# Patient Record
(demographics unavailable — no encounter records)

---

## 2025-05-18 NOTE — REASON FOR VISIT
[Telephone (audio)] : This telephonic visit was provided via audio only technology. [Technical] : patient unable to effectively utilize tele-video due to technical issues. [Follow-Up: _____] : a [unfilled] follow-up visit

## 2025-05-19 NOTE — ASSESSMENT
[FreeTextEntry1] : Ms. SHENA REYNA, 41 year old female, currently vaping, former smoker (1 ppd x 20 yrs, quit 2020), w/ hx of hypothyroidism, GERD, COID-19 (2022), anxiety, and depression, who found to have lung nodule incidentally on CT A/P for abdominal pain. Patient was consulted on 07/15/2024.   I have reviewed the patient's medical records and diagnostic images at time of this office consultation and have made the following recommendation: 1. CT chest reviewed and explained to patient, stable lung nodule, I recommended patient to return to office in 6 months with CT Chest without contrast.   I, ZAHIDA Patel, personally performed the evaluation and management (E/M) services for this established patient who follow up today with an existing condition.  That E/M includes conducting the examination, assessing all new/exacerbated/existing conditions, and establishing a plan of care.  Today, my ACP, ERVIN VillanuevaC, was here to observe my evaluation and management services for this existing condition to be followed going forward.

## 2025-05-19 NOTE — CONSULT LETTER
[Dear  ___] : Dear  [unfilled], [Consult Letter:] : I had the pleasure of evaluating your patient, [unfilled]. [Please see my note below.] : Please see my note below. [Consult Closing:] : Thank you very much for allowing me to participate in the care of this patient.  If you have any questions, please do not hesitate to contact me. [Sincerely,] : Sincerely, [FreeTextEntry2] : Dr. Casey Young (PCP/Ref) [FreeTextEntry3] : Akhil Angeles MD  Attending Surgeon  Division of Thoracic Surgery  , Cardiovascular and Thoracic Surgery  [facility] at [facility]

## 2025-05-19 NOTE — HISTORY OF PRESENT ILLNESS
[FreeTextEntry1] : Ms. SHENA REYNA, 41 year old female, currently vaping, former smoker (1 ppd x 20 yrs, quit 2020), w/ hx of hypothyroidism, GERD, COID-19 (2022), anxiety, and depression, who found to have lung nodule incidentally on CT A/P for abdominal pain. Patient was consulted on 07/15/2024.   Of note, she underwent a Robotic assisted Laparoscopic cholecystectomy on 06/24/2024 pathology results are consistent with Cholecystitis.  CT Coronary Angio on 05/03/23 ([facility]): - Indeterminate 8 mm right middle lobe pulmonary nodule.  CT A/P on 05/08/2024: - 10 x 9 mm solid subpleural nodule in the medial RML, new since 2016.  CT Chest on 06/15/24 ([facility]): - There is a solid noncalcified 8 mm right middle lobe medial subpleural pulmonary nodule on image 181, series 8, stable from 5/8/2023. - No additional pulmonary nodule identified.  On 07/15/2024, Imaging reviewed with patient, RML nodule first noted on CT in 05/2023, appears stable on most recent scan, of unclear etiology. I discussed obtaining PET/CT for further evaluation. I also want her to obtain PFTs.   PET/CT on 08/21/2024:  - Revisualized RIGHT LOWER lobe lung nodule again abuts the pericardium (1.0 x 0.8 cm, image 113), demonstrates unremarkable uptake indistinguishable from adjacent pericardium as well as some adjacent liver. Evaluation is somewhat suboptimal due to its location at the lung base and close proximity to the pericardial border and diaphragm.  PFTs on 07/26/2024: Cannot tolerated due to post-op pain from cholecystectomy.   On 08/26/2024, PET/CT reviewed with patient; no activity noted to RLL nodule, of unclear etiology. Discussed options including surgical resection for tissue diagnosis vs. surveillance. Surgical approach reviewed with patient. Discussed risks in details. Patient would like to think over her options and follow up via tele visit in 4 weeks to discuss decision.   On 09/30/2024, Discussed with patient, RLL nodule not PET avid. We discussed surgical resection vs continue f/u with CT chest. Patient prefers conservative management. Will repeat CT chest w/o contrast in 11/2024 and RTC after to discuss findings. If RLL nodule enlarges, will resect.   CT chest on 05/05/2025:  -  Stable 9 x 9 mm right middle lobe pulmonary nodule.  Patient is followed today via Telephonic visit. Patient c/o SOB on exertion, denies cough, chest pain, fever, chills.  Vaginal delivery

## 2025-05-19 NOTE — HISTORY OF PRESENT ILLNESS
[FreeTextEntry1] : Ms. SHENA REYNA, 41 year old female, currently vaping, former smoker (1 ppd x 20 yrs, quit 2020), w/ hx of hypothyroidism, GERD, COID-19 (2022), anxiety, and depression, who found to have lung nodule incidentally on CT A/P for abdominal pain. Patient was consulted on 07/15/2024.   Of note, she underwent a Robotic assisted Laparoscopic cholecystectomy on 06/24/2024 pathology results are consistent with Cholecystitis.  CT Coronary Angio on 05/03/23 ([facility]): - Indeterminate 8 mm right middle lobe pulmonary nodule.  CT A/P on 05/08/2024: - 10 x 9 mm solid subpleural nodule in the medial RML, new since 2016.  CT Chest on 06/15/24 ([facility]): - There is a solid noncalcified 8 mm right middle lobe medial subpleural pulmonary nodule on image 181, series 8, stable from 5/8/2023. - No additional pulmonary nodule identified.  On 07/15/2024, Imaging reviewed with patient, RML nodule first noted on CT in 05/2023, appears stable on most recent scan, of unclear etiology. I discussed obtaining PET/CT for further evaluation. I also want her to obtain PFTs.   PET/CT on 08/21/2024:  - Revisualized RIGHT LOWER lobe lung nodule again abuts the pericardium (1.0 x 0.8 cm, image 113), demonstrates unremarkable uptake indistinguishable from adjacent pericardium as well as some adjacent liver. Evaluation is somewhat suboptimal due to its location at the lung base and close proximity to the pericardial border and diaphragm.  PFTs on 07/26/2024: Cannot tolerated due to post-op pain from cholecystectomy.   On 08/26/2024, PET/CT reviewed with patient; no activity noted to RLL nodule, of unclear etiology. Discussed options including surgical resection for tissue diagnosis vs. surveillance. Surgical approach reviewed with patient. Discussed risks in details. Patient would like to think over her options and follow up via tele visit in 4 weeks to discuss decision.   On 09/30/2024, Discussed with patient, RLL nodule not PET avid. We discussed surgical resection vs continue f/u with CT chest. Patient prefers conservative management. Will repeat CT chest w/o contrast in 11/2024 and RTC after to discuss findings. If RLL nodule enlarges, will resect.   CT chest on 05/05/2025:  -  Stable 9 x 9 mm right middle lobe pulmonary nodule.  Patient is followed today via Telephonic visit. Patient c/o SOB on exertion, denies cough, chest pain, fever, chills.

## 2025-05-19 NOTE — CONSULT LETTER
[Dear  ___] : Dear  [unfilled], [Consult Letter:] : I had the pleasure of evaluating your patient, [unfilled]. [Please see my note below.] : Please see my note below. [Consult Closing:] : Thank you very much for allowing me to participate in the care of this patient.  If you have any questions, please do not hesitate to contact me. [Sincerely,] : Sincerely, [FreeTextEntry2] : Dr. Casey Young (PCP/Ref) [FreeTextEntry3] : Akhil Angeles MD  Attending Surgeon  Division of Thoracic Surgery  , Cardiovascular and Thoracic Surgery  [facility] [employer] [facility] at [facility]